# Patient Record
Sex: FEMALE | Race: WHITE | HISPANIC OR LATINO | URBAN - METROPOLITAN AREA
[De-identification: names, ages, dates, MRNs, and addresses within clinical notes are randomized per-mention and may not be internally consistent; named-entity substitution may affect disease eponyms.]

---

## 2017-04-27 ENCOUNTER — EMERGENCY (EMERGENCY)
Facility: HOSPITAL | Age: 22
LOS: 1 days | Discharge: HOME | End: 2017-04-27
Attending: EMERGENCY MEDICINE | Admitting: EMERGENCY MEDICINE
Payer: COMMERCIAL

## 2017-04-27 VITALS
TEMPERATURE: 98 F | DIASTOLIC BLOOD PRESSURE: 63 MMHG | OXYGEN SATURATION: 99 % | SYSTOLIC BLOOD PRESSURE: 109 MMHG | HEART RATE: 60 BPM | RESPIRATION RATE: 16 BRPM

## 2017-04-27 PROCEDURE — 99283 EMERGENCY DEPT VISIT LOW MDM: CPT

## 2017-04-27 NOTE — ED PROVIDER NOTE - NS ED MD SCRIBE ATTENDING SCRIBE SECTIONS
HISTORY OF PRESENT ILLNESS/VITAL SIGNS( Pullset)/HIV/DISPOSITION/PHYSICAL EXAM/PAST MEDICAL/SURGICAL/SOCIAL HISTORY/REVIEW OF SYSTEMS

## 2017-04-27 NOTE — ED PROVIDER NOTE - OBJECTIVE STATEMENT
22 y/o female with no significant PMHx of right knee pain x 2 days. Pt reports high amounts of walking 2 days ago. Later that night, onset of pain. Denies fever, nausea, vomiting, numbness, tingling, weakness, and any other complaints.

## 2017-04-27 NOTE — ED PROVIDER NOTE - PLAN OF CARE
Rest, drink plenty of fluids.  Advance activity as tolerated.  Continue all previously prescribed medications as directed.  Take Motrin 600 mg (three 200 mg over the counter pills) every 8 hours as needed for moderate pain -- take with food. Keep extremity elevated and wrapped.  Apply cool compresses to affected area for 15 minutes, 3-4 times per day. Follow up with your primary care physician in 48-72 hours- bring copies of your results.  Return to the ER for worsening or persistent symptoms, including but not limited to redness, warmth, inability to walk, fevers and/or ANY NEW OR CONCERNING SYMPTOMS. If you have issues obtaining follow up, please call: [phone] to obtain a doctor or specialist who takes your insurance in your area.

## 2017-04-27 NOTE — ED PROVIDER NOTE - MEDICAL DECISION MAKING DETAILS
Pt with knee swelling probable due to recent exercise. Probable bursitis. Will D/C home on RICE instructions.

## 2017-04-27 NOTE — ED PROVIDER NOTE - CARE PLAN
Principal Discharge DX:	Knee pain, right  Instructions for follow-up, activity and diet:	Rest, drink plenty of fluids.  Advance activity as tolerated.  Continue all previously prescribed medications as directed.  Take Motrin 600 mg (three 200 mg over the counter pills) every 8 hours as needed for moderate pain -- take with food. Keep extremity elevated and wrapped.  Apply cool compresses to affected area for 15 minutes, 3-4 times per day. Follow up with your primary care physician in 48-72 hours- bring copies of your results.  Return to the ER for worsening or persistent symptoms, including but not limited to redness, warmth, inability to walk, fevers and/or ANY NEW OR CONCERNING SYMPTOMS. If you have issues obtaining follow up, please call: [phone] to obtain a doctor or specialist who takes your insurance in your area. Principal Discharge DX:	Knee pain, right  Instructions for follow-up, activity and diet:	Rest, drink plenty of fluids.  Advance activity as tolerated.  Continue all previously prescribed medications as directed.  Take Motrin 600 mg (three 200 mg over the counter pills) every 8 hours as needed for moderate pain -- take with food. Keep extremity elevated and wrapped.  Apply cool compresses to affected area for 15 minutes, 3-4 times per day. Follow up with your primary care physician in 48-72 hours- bring copies of your results.  Return to the ER for worsening or persistent symptoms, including but not limited to redness, warmth, inability to walk, fevers and/or ANY NEW OR CONCERNING SYMPTOMS. If you have issues obtaining follow up, please call: [phone] to obtain a doctor or specialist who takes your insurance in your area.

## 2017-04-27 NOTE — ED ADULT TRIAGE NOTE - CHIEF COMPLAINT QUOTE
pt c/o right knee pain x2 days. pt denies injury. states " I was walking for a long time with my rain boots and now my knee hurts and feel stiff."

## 2018-12-12 ENCOUNTER — EMERGENCY (EMERGENCY)
Facility: HOSPITAL | Age: 23
LOS: 1 days | Discharge: HOME | End: 2018-12-12
Attending: EMERGENCY MEDICINE
Payer: COMMERCIAL

## 2018-12-12 VITALS
WEIGHT: 93.04 LBS | SYSTOLIC BLOOD PRESSURE: 126 MMHG | RESPIRATION RATE: 18 BRPM | HEART RATE: 120 BPM | HEIGHT: 59 IN | OXYGEN SATURATION: 99 % | DIASTOLIC BLOOD PRESSURE: 86 MMHG | TEMPERATURE: 98 F

## 2018-12-12 VITALS — HEART RATE: 79 BPM

## 2018-12-12 LAB
ALBUMIN SERPL ELPH-MCNC: 4.6 G/DL — SIGNIFICANT CHANGE UP (ref 3.3–5)
ALP SERPL-CCNC: 71 U/L — SIGNIFICANT CHANGE UP (ref 40–120)
ALT FLD-CCNC: 10 U/L — SIGNIFICANT CHANGE UP (ref 10–45)
ANION GAP SERPL CALC-SCNC: 15 MMOL/L — SIGNIFICANT CHANGE UP (ref 5–17)
AST SERPL-CCNC: 11 U/L — SIGNIFICANT CHANGE UP (ref 10–40)
BASOPHILS # BLD AUTO: 0 K/UL — SIGNIFICANT CHANGE UP (ref 0–0.2)
BASOPHILS NFR BLD AUTO: 0.3 % — SIGNIFICANT CHANGE UP (ref 0–2)
BILIRUB SERPL-MCNC: 0.4 MG/DL — SIGNIFICANT CHANGE UP (ref 0.2–1.2)
BUN SERPL-MCNC: 11 MG/DL — SIGNIFICANT CHANGE UP (ref 7–23)
CALCIUM SERPL-MCNC: 9.7 MG/DL — SIGNIFICANT CHANGE UP (ref 8.4–10.5)
CHLORIDE SERPL-SCNC: 102 MMOL/L — SIGNIFICANT CHANGE UP (ref 96–108)
CO2 SERPL-SCNC: 24 MMOL/L — SIGNIFICANT CHANGE UP (ref 22–31)
CREAT SERPL-MCNC: 0.75 MG/DL — SIGNIFICANT CHANGE UP (ref 0.5–1.3)
D DIMER BLD IA.RAPID-MCNC: <150 NG/ML DDU — SIGNIFICANT CHANGE UP
EOSINOPHIL # BLD AUTO: 0.1 K/UL — SIGNIFICANT CHANGE UP (ref 0–0.5)
EOSINOPHIL NFR BLD AUTO: 0.4 % — SIGNIFICANT CHANGE UP (ref 0–6)
GLUCOSE SERPL-MCNC: 96 MG/DL — SIGNIFICANT CHANGE UP (ref 70–99)
HCT VFR BLD CALC: 39.7 % — SIGNIFICANT CHANGE UP (ref 34.5–45)
HGB BLD-MCNC: 13.6 G/DL — SIGNIFICANT CHANGE UP (ref 11.5–15.5)
LYMPHOCYTES # BLD AUTO: 1.7 K/UL — SIGNIFICANT CHANGE UP (ref 1–3.3)
LYMPHOCYTES # BLD AUTO: 11.2 % — LOW (ref 13–44)
MCHC RBC-ENTMCNC: 33.1 PG — SIGNIFICANT CHANGE UP (ref 27–34)
MCHC RBC-ENTMCNC: 34.3 GM/DL — SIGNIFICANT CHANGE UP (ref 32–36)
MCV RBC AUTO: 96.3 FL — SIGNIFICANT CHANGE UP (ref 80–100)
MONOCYTES # BLD AUTO: 0.9 K/UL — SIGNIFICANT CHANGE UP (ref 0–0.9)
MONOCYTES NFR BLD AUTO: 5.6 % — SIGNIFICANT CHANGE UP (ref 2–14)
NEUTROPHILS # BLD AUTO: 12.7 K/UL — HIGH (ref 1.8–7.4)
NEUTROPHILS NFR BLD AUTO: 82.5 % — HIGH (ref 43–77)
PLATELET # BLD AUTO: 224 K/UL — SIGNIFICANT CHANGE UP (ref 150–400)
POTASSIUM SERPL-MCNC: 3.7 MMOL/L — SIGNIFICANT CHANGE UP (ref 3.5–5.3)
POTASSIUM SERPL-SCNC: 3.7 MMOL/L — SIGNIFICANT CHANGE UP (ref 3.5–5.3)
PROT SERPL-MCNC: 7.3 G/DL — SIGNIFICANT CHANGE UP (ref 6–8.3)
RBC # BLD: 4.13 M/UL — SIGNIFICANT CHANGE UP (ref 3.8–5.2)
RBC # FLD: 10.6 % — SIGNIFICANT CHANGE UP (ref 10.3–14.5)
SODIUM SERPL-SCNC: 141 MMOL/L — SIGNIFICANT CHANGE UP (ref 135–145)
WBC # BLD: 15.4 K/UL — HIGH (ref 3.8–10.5)
WBC # FLD AUTO: 15.4 K/UL — HIGH (ref 3.8–10.5)

## 2018-12-12 PROCEDURE — 85027 COMPLETE CBC AUTOMATED: CPT

## 2018-12-12 PROCEDURE — 93010 ELECTROCARDIOGRAM REPORT: CPT

## 2018-12-12 PROCEDURE — 99284 EMERGENCY DEPT VISIT MOD MDM: CPT | Mod: 25

## 2018-12-12 PROCEDURE — 80053 COMPREHEN METABOLIC PANEL: CPT

## 2018-12-12 PROCEDURE — 85379 FIBRIN DEGRADATION QUANT: CPT

## 2018-12-12 PROCEDURE — 84443 ASSAY THYROID STIM HORMONE: CPT

## 2018-12-12 PROCEDURE — 93005 ELECTROCARDIOGRAM TRACING: CPT

## 2018-12-12 RX ORDER — SODIUM CHLORIDE 9 MG/ML
1000 INJECTION INTRAMUSCULAR; INTRAVENOUS; SUBCUTANEOUS ONCE
Qty: 0 | Refills: 0 | Status: COMPLETED | OUTPATIENT
Start: 2018-12-12 | End: 2018-12-12

## 2018-12-12 RX ADMIN — SODIUM CHLORIDE 1000 MILLILITER(S): 9 INJECTION INTRAMUSCULAR; INTRAVENOUS; SUBCUTANEOUS at 21:56

## 2018-12-12 NOTE — ED ADULT NURSE REASSESSMENT NOTE - NS ED NURSE REASSESS COMMENT FT1
Patient d/c from ED, VSS, no longer having palpitations. Told to follow up with Cardiology. Discharge paperwork provided, verbalized understanding of teaching. Safety maintained.

## 2018-12-12 NOTE — ED PROVIDER NOTE - NSFOLLOWUPCLINICS_GEN_ALL_ED_FT
[facility]  Cardiology  [address] [place] [id number]  Phone: [phone]  Fax:   Follow Up Time:

## 2018-12-12 NOTE — ED PROVIDER NOTE - OBJECTIVE STATEMENT
Delroy Santiago (MD) : 22 y/o F pt with PMHx of anxiety, FHx of blood clot (paternal grandfather) c/o intermittent palpitations and nausea x2 weeks, worsening today. Currently not feeling any sx. States she normally gets the sx after eating. States that yesterday, pt had 1 episode of vomiting after eating. She said after eating she had a sensation of pressure, so she vomited. Noted CP with breathing deeply today. Notes sx is different from her hx of anxiety. Pt is sexually active, only sometimes using condoms. Denies diarrhea, fever, dysuria, vaginal discharge, leg pain/swelling, recent travel or any other complaints. LMP: last week. (few days late) Delroy Santiago (MD) : 24 y/o F pt with PMHx of anxiety, FHx of blood clot (paternal grandfather) c/o intermittent palpitations and nausea x2 weeks, worsening today. Currently not feeling any sx. States she normally gets the sx after eating. States that yesterday, pt had 1 episode of vomiting after eating. She said after eating she had a sensation of pressure, so she vomited. Noted CP with breathing deeply today. Notes sx is different from her hx of anxiety. Pt is sexually active, only sometimes using condoms. Denies diarrhea, fever, dysuria, vaginal discharge, leg pain/swelling, recent travel or any other complaints. No OCP use. LMP: last week. (few days late). Grandfather with DVT/PE

## 2018-12-12 NOTE — ED ADULT NURSE NOTE - OBJECTIVE STATEMENT
24 y/o female with PMH anxiety, arrives to ED with c/o palpitations x 2 weeks. Patient reports "I thought it was from my anxiety", but had chest pain starting yesterday. Patient is ambulatory without difficulty, appearing well, no chest pain upon arrival. Patient reports family history of blood clot on maternal side grandfather. Patient states when she feels palpitations "It takes my breath away". Patient had one episode of vomiting yesterday after eating. No fever/chills, no urinary symptoms, no recent travel. Reports she is sexual active, does not always use protection, does not take birth control. Patient tachycardic to 110's upon arrival to ED. CM in place. VSS, afebrile. Patient denies smoking, ETOH use, or drug use.

## 2018-12-12 NOTE — ED PROVIDER NOTE - PROGRESS NOTE DETAILS
Pt feeling much better. HR improved to 80s. No current symptoms. Discussed with patient neg results. TSH pending, F/u with Cards for possible Holter

## 2018-12-12 NOTE — ED PROVIDER NOTE - ENMT, MLM
Airway patent, Nasal mucosa clear. Mouth with normal mucosa. Throat has no vesicles, no oropharyngeal exudates and uvula is midline. No evidence of goiter

## 2018-12-12 NOTE — ED PROVIDER NOTE - NSFOLLOWUPINSTRUCTIONS_ED_ALL_ED_FT
Please follow-up with your primary doctor as needed. If you continue to have symptoms, please follow-up with Cardiology for possible Holter Monitor.     We will call if your Thyroid Tests are abnormal.     Drink plenty of fluids.

## 2018-12-12 NOTE — ED PROVIDER NOTE - MEDICAL DECISION MAKING DETAILS
Delroy Santiago (MD) : 24 y/o F pt with PMHx of anxiety presents to the ED for palpitations x2 weeks and 2 days of CP exacerbation with deep inspiration. Also notes 1 episode of vomiting yesterday. No evidence of DVT on exam. Plan: EKG labs with TSH, urine pregnancy, liter bolus and reassess. Delroy Santiago (MD) : 22 y/o F pt with PMHx of anxiety presents to the ED for palpitations x2 weeks and 2 days of CP exacerbation with deep inspiration. Also notes 1 episode of vomiting yesterday. No evidence of DVT on exam. Plan: EKG labs with TSH, Dimer, urine pregnancy, liter bolus and reassess. Discussed contraception use as pt uses intermittent condoms, no OCP. Delroy Santiago (MD) : 22 y/o F pt with PMHx of anxiety presents to the ED for palpitations x2 weeks and 2 days of CP exacerbation with deep inspiration. Also notes 1 episode of vomiting yesterday. No evidence of DVT on exam. Plan: EKG labs with TSH, Dimer, urine pregnancy, liter bolus and reassess. Discussed contraception use as pt uses intermittent condoms, no OCP.    Cierra Arevalo MD - Attending Physician: Pt here with intermittent palpitations. Now well appearing, mild tachy on arrival. Low risk for dvt/pe; however not PERC neg. ?hyperthyroid vs anemia vs arrhythmia vs anxiety vs other. Work-up as ordered

## 2018-12-12 NOTE — ED PROVIDER NOTE - ATTENDING CONTRIBUTION TO CARE
Cierra Arevalo MD - Attending Physician: I have personally seen and examined this patient with the resident/fellow.  I have fully participated in the care of this patient. I have reviewed all pertinent clinical information, including history, physical exam, plan and the Resident/Fellow’s note and agree except as noted. See MDM

## 2018-12-13 LAB — TSH SERPL-MCNC: 1.55 UIU/ML — SIGNIFICANT CHANGE UP (ref 0.27–4.2)

## 2022-08-20 ENCOUNTER — EMERGENCY (EMERGENCY)
Facility: HOSPITAL | Age: 27
LOS: 1 days | Discharge: HOME | End: 2022-08-20
Attending: EMERGENCY MEDICINE
Payer: COMMERCIAL

## 2022-08-20 VITALS
OXYGEN SATURATION: 100 % | WEIGHT: 93.04 LBS | RESPIRATION RATE: 18 BRPM | TEMPERATURE: 98 F | HEIGHT: 59 IN | SYSTOLIC BLOOD PRESSURE: 134 MMHG | HEART RATE: 111 BPM | DIASTOLIC BLOOD PRESSURE: 81 MMHG

## 2022-08-20 PROCEDURE — 12001 RPR S/N/AX/GEN/TRNK 2.5CM/<: CPT

## 2022-08-20 PROCEDURE — 99283 EMERGENCY DEPT VISIT LOW MDM: CPT | Mod: 25

## 2022-08-20 PROCEDURE — 99282 EMERGENCY DEPT VISIT SF MDM: CPT | Mod: 25

## 2022-08-20 NOTE — ED ADULT NURSE NOTE - HISTORY OF COVID-19 VACCINATION
Called pt and advised of Cinthya PUGA's note.  Pt verbalized understanding.    Pt states he is not feeling any better since medication change, states still have the symptoms of reflux. Advised will send a msg to EDD Mendes.    No

## 2022-08-20 NOTE — ED PROCEDURE NOTE - CPROC ED POST PROC CARE GUIDE1
Verbal/written post procedure instructions were given to patient/caregiver./Instructed patient/caregiver to follow-up with primary care physician./Instructed patient/caregiver regarding signs and symptoms of infection./Keep the cast/splint/dressing clean and dry. Verbal/written post procedure instructions were given to patient/caregiver./Instructed patient/caregiver regarding signs and symptoms of infection./Keep the cast/splint/dressing clean and dry.

## 2022-08-20 NOTE — ED ADULT TRIAGE NOTE - CHIEF COMPLAINT QUOTE
Pt stated she was cooking and accidentally picked up the knife wrong and cut the 2nd finger on her right hand this occurred 1/2 hour ago c/o throbbing sensation and pain

## 2022-08-20 NOTE — ED ADULT NURSE NOTE - CAS EDN DISCHARGE ASSESSMENT
Progress Note    SUBJECTIVE: Patient is seen for Active Problems:    Acute abdominal pain    Mild malnutrition (HCC)  Resolved Problems:    * No resolved hospital problems. *     abdominal pain better    OBJECTIVE:    Vitals:   Vitals:    07/20/18 0800   BP: (!) 153/58   Pulse: 53   Resp: 14   Temp:    SpO2: 92%     Weight: 152 lb 3.2 oz (69 kg)   Height: 5' 6\" (167.6 cm)   -----------------------------------------------------------------  Exam:    CONSTITUTIONAL:  awake, alert, cooperative, no apparent distress, and appears stated age  EYES:  Lids and lashes normal, pupils equal, round and reactive to light, extra ocular muscles intact, sclera clear, conjunctiva normal  ENT:  Normocephalic, without obvious abnormality, atraumatic, sinuses nontender on palpation, external ears without lesions, oral pharynx with moist mucus membranes, tonsils without erythema or exudates, gums normal and good dentition. NECK:  Supple, symmetrical, trachea midline, no adenopathy, thyroid symmetric, not enlarged and no tenderness, skin normal  HEMATOLOGIC/LYMPHATICS:  no cervical lymphadenopathy  LUNGS:  No increased work of breathing, good air exchange, clear to auscultation bilaterally, no crackles or wheezing  CARDIOVASCULAR:  Normal apical impulse, regular rate and rhythm, normal S1 and S2, no S3 or S4, and no murmur noted  ABDOMEN:  Soft,no tenderness,bowel sounds present, ng is positive for occult blood    MUSCULOSKELETAL:  there is no redness, warmth, or swelling of the joints  NEUROLOGIC:  Awake, alert, oriented to name, place and time. Cranial nerves II-XII are grossly intact. Motor is 5 out of 5 bilaterally. Cerebellar finger to nose, heel to shin intact. Sensory is intact.   Babinski down going, Romberg negative, and gait is normal.  SKIN:  no bruising or bleeding  EXT:     no cyanosis, clubbing or edema present    -----------------------------------------------------------------  Diagnostic Data: Reviewed    More Recent Troponin   Result Value Ref Range    Troponin T <0.03 <0.03 ng/mL    Troponin Interp         Lactic acid, plasma   Result Value Ref Range    Lactic Acid 0.8 0.5 - 2.2 mmol/L    Lactic Acid, Whole Blood NOT REPORTED 0.7 - 2.1 mmol/L   Occult blood gastric / duodenum   Result Value Ref Range    Specimen Description . GASTRIC     Special Requests NOT REPORTED     Direct Exam POSITIVE (A)     Status FINAL 07/20/2018    Comprehensive Metabolic Panel   Result Value Ref Range    Glucose 106 (H) 70 - 99 mg/dL    BUN 14 8 - 23 mg/dL    CREATININE 0.69 0.50 - 0.90 mg/dL    Bun/Cre Ratio 20 9 - 20    Calcium 9.4 8.6 - 10.4 mg/dL    Sodium 138 135 - 144 mmol/L    Potassium 4.1 3.7 - 5.3 mmol/L    Chloride 96 (L) 98 - 107 mmol/L    CO2 30 20 - 31 mmol/L    Anion Gap 12 9 - 17 mmol/L    Alkaline Phosphatase 65 35 - 104 U/L    ALT 7 5 - 33 U/L    AST 13 <32 U/L    Total Bilirubin 0.42 0.3 - 1.2 mg/dL    Total Protein 8.0 6.4 - 8.3 g/dL    Alb 3.4 (L) 3.5 - 5.2 g/dL    Albumin/Globulin Ratio 0.7 (L) 1.0 - 2.5    GFR Non-African American >60 >60 mL/min    GFR African American >60 >60 mL/min    GFR Comment          GFR Staging         EKG 12 Lead   Result Value Ref Range    Ventricular Rate 50 BPM    Atrial Rate 50 BPM    P-R Interval 188 ms    QRS Duration 94 ms    Q-T Interval 454 ms    QTc Calculation (Bazett) 413 ms    P Axis 31 degrees    R Axis 0 degrees    T Axis 34 degrees   EKG 12 Lead   Result Value Ref Range    Ventricular Rate 134 BPM    Atrial Rate 59 BPM    QRS Duration 134 ms    Q-T Interval 326 ms    QTc Calculation (Bazett) 486 ms    R Axis 70 degrees    T Axis 36 degrees   EKG 12 Lead   Result Value Ref Range    Ventricular Rate 81 BPM    Atrial Rate 81 BPM    P-R Interval 192 ms    QRS Duration 102 ms    Q-T Interval 404 ms    QTc Calculation (Bazett) 469 ms    P Axis 64 degrees    R Axis 12 degrees    T Axis 38 degrees   EKG 12 lead   Result Value Ref Range    Ventricular Rate 60 BPM    Atrial Rate 60 BPM    P-R Alert and oriented to person, place and time

## 2022-08-20 NOTE — ED PROVIDER NOTE - ATTENDING APP SHARED VISIT CONTRIBUTION OF CARE
Agree with NP H&P.  Pt presents with laceration to finger.  Would to be repaired in ED.  Will dc with return precautions.

## 2022-08-20 NOTE — ED PROVIDER NOTE - NSICDXPASTMEDICALHX_GEN_ALL_CORE_FT
PAST MEDICAL HISTORY:  No pertinent past medical history PAST MEDICAL HISTORY:  Anxiety     No pertinent past medical history

## 2022-08-20 NOTE — ED PROVIDER NOTE - OBJECTIVE STATEMENT
27 year old female with no PHMx presents to the ED with complaints of right 2nd finger laceration. Patient states she grabbed the knife the wrong way and it slipped, causing laceration. Also states she is unsure of her last Tetanus but does not want Tetanus here. Also does not want any anesthesia.   NKDA.

## 2022-08-20 NOTE — ED PROVIDER NOTE - NSFOLLOWUPINSTRUCTIONS_ED_ALL_ED_FT
Laceration    A laceration is a cut that goes through all of the layers of the skin and into the tissue that is right under the skin. Some lacerations heal on their own. Others need to be closed with skin adhesive strips, skin glue, stitches (sutures), or staples. Proper laceration care minimizes the risk of infection and helps the laceration to heal better.  If non-absorbable stitches or staples have been placed, they must be taken out within the time frame instructed by your healthcare provider.    SEEK IMMEDIATE MEDICAL CARE IF YOU HAVE ANY OF THE FOLLOWING SYMPTOMS: swelling around the wound, worsening pain, drainage from the wound, red streaking going away from your wound, inability to move finger or toe near the laceration, or discoloration of skin near the laceration.    Keep the dressing in place as is for 24 hours. Do not allow to become wet.    After 24 hours, you may remove the dressing and clean with regular soap and water daily. Do NOT scrub.    Dry thoroughly and apply bacitracin/ointment. Cover with regular bandage. Do not expose wound to light.     ***please return to the ER in 10 days for suture removal***

## 2022-08-20 NOTE — ED PROVIDER NOTE - CLINICAL SUMMARY MEDICAL DECISION MAKING FREE TEXT BOX
6 sutures placed. Patient tolerated procedure well despite not wanting any anesthesia. Patient was offered multiple times for Tetanus as well as risk of not having Tetanus but she still does not want it.

## 2022-08-20 NOTE — ED PROVIDER NOTE - SKIN, MLM
2cm gaping laceration to the meyer surface above the right 2nd PIP joint. No deep structure involvement. 5/5 strength with extension and flexion. Cap refill under 2s.

## 2022-08-20 NOTE — ED PROVIDER NOTE - PATIENT PORTAL LINK FT
You can access the FollowMyHealth Patient Portal offered by [facility] by registering at the following website: [email or web address]. By joining "[employer]’s FollowMyHealth portal, you will also be able to view your health information using other applications (apps) compatible with our system.

## 2022-09-01 ENCOUNTER — EMERGENCY (EMERGENCY)
Facility: HOSPITAL | Age: 27
LOS: 1 days | Discharge: HOME | End: 2022-09-01
Attending: EMERGENCY MEDICINE
Payer: COMMERCIAL

## 2022-09-01 VITALS
HEART RATE: 90 BPM | OXYGEN SATURATION: 98 % | SYSTOLIC BLOOD PRESSURE: 126 MMHG | RESPIRATION RATE: 16 BRPM | WEIGHT: 98.11 LBS | TEMPERATURE: 99 F | HEIGHT: 59 IN | DIASTOLIC BLOOD PRESSURE: 77 MMHG

## 2022-09-01 PROBLEM — F41.9 ANXIETY DISORDER, UNSPECIFIED: Chronic | Status: ACTIVE | Noted: 2022-08-23

## 2022-09-01 PROCEDURE — G0463: CPT

## 2022-09-01 PROCEDURE — L9995: CPT

## 2022-09-01 NOTE — ED PROVIDER NOTE - PHYSICAL EXAMINATION
Right index finger volar aspect of the PIP with 6 sutures in place.  No erythema induration discharge or dehiscence.  Difficulty flexing and extending the finger.  Capillary refill less than 2 seconds.

## 2022-09-01 NOTE — ED PROVIDER NOTE - PATIENT PORTAL LINK FT
You can access the FollowMyHealth Patient Portal offered by [facility] by registering at the following website: [email or web address]. By joining [employer]’s FollowMyHealth portal, you will also be able to view your health information using other applications (apps) compatible with our system.

## 2022-09-01 NOTE — ED PROVIDER NOTE - NSFOLLOWUPINSTRUCTIONS_ED_ALL_ED_FT
Start moving your finger (gently) more.  If you feel like the range of motion or the strength in your finger is not coming back to baseline you should follow up with the hand surgeon.    If you experience any new or worsening symptoms or if you are concerned you can always come back to the emergency for a re-evaluation.

## 2022-09-01 NOTE — ED PROVIDER NOTE - OBJECTIVE STATEMENT
27-year-old female, presents for suture removal status post laceration repair to the right index finger 10 days ago.  Was trying not to move the finger for the whole time now feeling some localized stiffness.  Denies any pain numbness tingling focal weakness discharge or any other complaints. Denies any fever, chills, redness, opening of wound, drainage, bleeding, streaking, numbness, tingling or any  other complaint.

## 2022-09-16 ENCOUNTER — APPOINTMENT (OUTPATIENT)
Dept: ORTHOPEDIC SURGERY | Facility: CLINIC | Age: 27
End: 2022-09-16
Payer: COMMERCIAL

## 2022-09-16 VITALS — HEIGHT: 59 IN | WEIGHT: 9.19 LBS | BODY MASS INDEX: 1.85 KG/M2

## 2022-09-16 DIAGNOSIS — S61.219A LACERATION W/OUT FOREIGN BODY OF UNSPECIFIED FINGER W/OUT DAMAGE TO NAIL, INITIAL ENCOUNTER: ICD-10-CM

## 2022-09-16 PROCEDURE — 99203 OFFICE O/P NEW LOW 30 MIN: CPT

## 2022-09-16 PROCEDURE — 73140 X-RAY EXAM OF FINGER(S): CPT | Mod: RT

## 2022-09-16 NOTE — IMAGING
[de-identified] : Right index finger\par 1.5 cm scar along the radial aspect of the PIP joint, mildly TTP\par No erythema, swelling or wounds\par Actively flexes/extends MCP/PIP/DIP joints\par Moderate stiffness at the PIP, flexes to 90 actively, 30 deg short of full extension\par SILT - mild paresthesias notes at scar\par \par

## 2022-09-16 NOTE — HISTORY OF PRESENT ILLNESS
[Sudden] : sudden [Dull/Aching] : dull/aching [Tightness] : tightness [Intermittent] : intermittent [Household chores] : household chores [Leisure] : leisure [Nothing helps with pain getting better] : Nothing helps with pain getting better [de-identified] : 28yo RHD F who presents to the office 3 weeks after sustaining a laceration to the right index finger. The patient notes that she was cutting peppers when she accidentally grabbed the knife incorrectly, cutting her finger at the level of the PIP joint. The patient was seen by urgent care where her wound was closed. Today she notes moderate stiffness and paresthesias at the level of the scar. [] : no [FreeTextEntry5] : Salena is here today for her RT Index finger.\par Cut it 3 weeks ago while cooking.\par Unable to bend the finger fully back and or forth.\par Swollen on the bottom half of the finger.\par Area of cut is just very sensitive  [de-identified] : 3 weeks ago

## 2022-09-16 NOTE — DISCUSSION/SUMMARY
[de-identified] : I had a long conversation with the patient today regarding the nature of this injury as well as the expected prognosis and potential treatment options. We discussed the role for bracing, injections and therapy. Fortunately she has an intact flexor mechanism without signs of neurovascular compromise. Her ROM could be improved and for that reason I will send her to hand therapy. The patient had the opportunity to ask questions, all of which were answered to her satisfaction. She is in agreement with this plan. I will see her again in 6 weeks for repeat evaluation.\par

## 2022-09-16 NOTE — DATA REVIEWED
[FreeTextEntry1] : 3 views of the right hand were obtained in the office today and independently evaluated without evidence of fracture or malalignment\par

## 2022-11-04 ENCOUNTER — APPOINTMENT (OUTPATIENT)
Dept: ORTHOPEDIC SURGERY | Facility: CLINIC | Age: 27
End: 2022-11-04

## 2023-11-22 NOTE — ED PROVIDER NOTE - HIV OFFER
Initial Liver Disease     Referred by:  Miki Knight DO      Dx:  Enlarged liver      Initial appointment:  12/18/2023 with Dr. Gilliland       Record and Insurance in Epic      Mailed new patient information     Opt out

## 2024-08-09 NOTE — ED PROVIDER NOTE - CONSTITUTIONAL, MLM
1 pair normal... Well appearing, well nourished, awake, alert, oriented to person, place, time/situation and in no apparent distress.

## 2024-09-05 ENCOUNTER — EMERGENCY (EMERGENCY)
Facility: HOSPITAL | Age: 29
LOS: 1 days | Discharge: HOME | End: 2024-09-05
Attending: EMERGENCY MEDICINE
Payer: COMMERCIAL

## 2024-09-05 VITALS
OXYGEN SATURATION: 99 % | WEIGHT: 78.93 LBS | RESPIRATION RATE: 20 BRPM | HEIGHT: 59 IN | DIASTOLIC BLOOD PRESSURE: 75 MMHG | SYSTOLIC BLOOD PRESSURE: 119 MMHG | TEMPERATURE: 99 F | HEART RATE: 96 BPM

## 2024-09-05 VITALS
RESPIRATION RATE: 18 BRPM | SYSTOLIC BLOOD PRESSURE: 106 MMHG | OXYGEN SATURATION: 100 % | DIASTOLIC BLOOD PRESSURE: 66 MMHG | HEART RATE: 80 BPM | TEMPERATURE: 99 F

## 2024-09-05 LAB
ALBUMIN SERPL ELPH-MCNC: 4.3 G/DL — SIGNIFICANT CHANGE UP (ref 3.3–5)
ALP SERPL-CCNC: 58 U/L — SIGNIFICANT CHANGE UP (ref 40–120)
ALT FLD-CCNC: 9 U/L — LOW (ref 10–45)
ANION GAP SERPL CALC-SCNC: 10 MMOL/L — SIGNIFICANT CHANGE UP (ref 5–17)
AST SERPL-CCNC: 16 U/L — SIGNIFICANT CHANGE UP (ref 10–40)
BASOPHILS # BLD AUTO: 0.03 K/UL — SIGNIFICANT CHANGE UP (ref 0–0.2)
BASOPHILS NFR BLD AUTO: 0.5 % — SIGNIFICANT CHANGE UP (ref 0–2)
BILIRUB SERPL-MCNC: 0.5 MG/DL — SIGNIFICANT CHANGE UP (ref 0.2–1.2)
BUN SERPL-MCNC: 14 MG/DL — SIGNIFICANT CHANGE UP (ref 7–23)
CALCIUM SERPL-MCNC: 9.7 MG/DL — SIGNIFICANT CHANGE UP (ref 8.4–10.5)
CHLORIDE SERPL-SCNC: 105 MMOL/L — SIGNIFICANT CHANGE UP (ref 96–108)
CO2 SERPL-SCNC: 22 MMOL/L — SIGNIFICANT CHANGE UP (ref 22–31)
CREAT SERPL-MCNC: 0.67 MG/DL — SIGNIFICANT CHANGE UP (ref 0.5–1.3)
EGFR: 123 ML/MIN/1.73M2 — SIGNIFICANT CHANGE UP
EOSINOPHIL # BLD AUTO: 0.04 K/UL — SIGNIFICANT CHANGE UP (ref 0–0.5)
EOSINOPHIL NFR BLD AUTO: 0.7 % — SIGNIFICANT CHANGE UP (ref 0–6)
GLUCOSE SERPL-MCNC: 87 MG/DL — SIGNIFICANT CHANGE UP (ref 70–99)
HCG SERPL-ACNC: <2 MIU/ML — SIGNIFICANT CHANGE UP
HCT VFR BLD CALC: 39.4 % — SIGNIFICANT CHANGE UP (ref 34.5–45)
HGB BLD-MCNC: 13 G/DL — SIGNIFICANT CHANGE UP (ref 11.5–15.5)
IMM GRANULOCYTES NFR BLD AUTO: 0.4 % — SIGNIFICANT CHANGE UP (ref 0–0.9)
LYMPHOCYTES # BLD AUTO: 1.35 K/UL — SIGNIFICANT CHANGE UP (ref 1–3.3)
LYMPHOCYTES # BLD AUTO: 24.5 % — SIGNIFICANT CHANGE UP (ref 13–44)
MCHC RBC-ENTMCNC: 32.4 PG — SIGNIFICANT CHANGE UP (ref 27–34)
MCHC RBC-ENTMCNC: 33 GM/DL — SIGNIFICANT CHANGE UP (ref 32–36)
MCV RBC AUTO: 98.3 FL — SIGNIFICANT CHANGE UP (ref 80–100)
MONOCYTES # BLD AUTO: 0.44 K/UL — SIGNIFICANT CHANGE UP (ref 0–0.9)
MONOCYTES NFR BLD AUTO: 8 % — SIGNIFICANT CHANGE UP (ref 2–14)
NEUTROPHILS # BLD AUTO: 3.63 K/UL — SIGNIFICANT CHANGE UP (ref 1.8–7.4)
NEUTROPHILS NFR BLD AUTO: 65.9 % — SIGNIFICANT CHANGE UP (ref 43–77)
NRBC # BLD: 0 /100 WBCS — SIGNIFICANT CHANGE UP (ref 0–0)
PLATELET # BLD AUTO: 226 K/UL — SIGNIFICANT CHANGE UP (ref 150–400)
POTASSIUM SERPL-MCNC: 3.7 MMOL/L — SIGNIFICANT CHANGE UP (ref 3.5–5.3)
POTASSIUM SERPL-SCNC: 3.7 MMOL/L — SIGNIFICANT CHANGE UP (ref 3.5–5.3)
PROT SERPL-MCNC: 7.2 G/DL — SIGNIFICANT CHANGE UP (ref 6–8.3)
RBC # BLD: 4.01 M/UL — SIGNIFICANT CHANGE UP (ref 3.8–5.2)
RBC # FLD: 11.8 % — SIGNIFICANT CHANGE UP (ref 10.3–14.5)
SODIUM SERPL-SCNC: 137 MMOL/L — SIGNIFICANT CHANGE UP (ref 135–145)
WBC # BLD: 5.51 K/UL — SIGNIFICANT CHANGE UP (ref 3.8–10.5)
WBC # FLD AUTO: 5.51 K/UL — SIGNIFICANT CHANGE UP (ref 3.8–10.5)

## 2024-09-05 PROCEDURE — 36415 COLL VENOUS BLD VENIPUNCTURE: CPT

## 2024-09-05 PROCEDURE — 82570 ASSAY OF URINE CREATININE: CPT

## 2024-09-05 PROCEDURE — 84585 ASSAY OF URINE VMA: CPT

## 2024-09-05 PROCEDURE — 76705 ECHO EXAM OF ABDOMEN: CPT | Mod: 26

## 2024-09-05 PROCEDURE — 85025 COMPLETE CBC W/AUTO DIFF WBC: CPT

## 2024-09-05 PROCEDURE — 99285 EMERGENCY DEPT VISIT HI MDM: CPT | Mod: 25

## 2024-09-05 PROCEDURE — 93005 ELECTROCARDIOGRAM TRACING: CPT

## 2024-09-05 PROCEDURE — 99285 EMERGENCY DEPT VISIT HI MDM: CPT

## 2024-09-05 PROCEDURE — 84702 CHORIONIC GONADOTROPIN TEST: CPT

## 2024-09-05 PROCEDURE — 80053 COMPREHEN METABOLIC PANEL: CPT

## 2024-09-05 PROCEDURE — 76705 ECHO EXAM OF ABDOMEN: CPT

## 2024-09-05 NOTE — ED PROVIDER NOTE - PATIENT PORTAL LINK FT
You can access the FollowMyHealth Patient Portal offered by [facility] by registering at the following website: [email or web address]. By joining "[employer]’s FollowMyHealth portal, you will also be able to view your health information using other applications (apps) compatible with our system.

## 2024-09-05 NOTE — ED ADULT NURSE NOTE - NSFALLUNIVINTERV_ED_ALL_ED
Bed/Stretcher in lowest position, wheels locked, appropriate side rails in place/Call bell, personal items and telephone in reach/Instruct patient to call for assistance before getting out of bed/chair/stretcher/Non-slip footwear applied when patient is off stretcher/[place] to call system/Physically safe environment - no spills, clutter or unnecessary equipment/Purposeful proactive rounding/Room/bathroom lighting operational, light cord in reach

## 2024-09-05 NOTE — ED PROVIDER NOTE - CLINICAL SUMMARY MEDICAL DECISION MAKING FREE TEXT BOX
Maged 29-year-old female no significant past medical history presents with 3 to 4 weeks of feeling mass under right ribs patient replies intermittent anxiety palpitations and headaches none currently the mass is tender to touch has seen PCP and had outpatient endocrine follow-up without any results currently patient denies any fever or any nausea vomiting any vision changes any chest pain or shortness of breath normal bowel movements patient sexually active 1 partner LMP 2 weeks ago history of chlamydia in past no vaginal discharge no vaginal bleeding on examination hemodynamically stable not tachycardic or hypertensive EKG nonischemic looking for any signs of arrhythmia will check electrolytes looking for any metabolic derangements abdominal exam soft nontender there is a palpable mobile mass in the inferior edge of the subphrenic area on the right uncertain etiology will POCUS if equivocal consider CT imaging with in setting of right upper quadrant mass will consider sending off urine metanephrines although pheochromocytoma low likelihood

## 2024-09-05 NOTE — ED ADULT TRIAGE NOTE - WEIGHT IN LBS
Chief Complaint   Patient presents with    Labs    Follow Up Chronic Condition     1. \"Have you been to the ER, urgent care clinic since your last visit? Hospitalized since your last visit? \" No    2. \"Have you seen or consulted any other health care providers outside of the [address] [place] [address] since your last visit? \" No     3. For patients aged 39-70: Has the patient had a colonoscopy / FIT/ Cologuard? No      If the patient is female:    4. For patients aged 41-77: Has the patient had a mammogram within the past 2 years? NA - based on age or sex      11. For patients aged 21-65: Has the patient had a pap smear?  NA - based on age or sex 78.9

## 2024-09-05 NOTE — ED PROVIDER NOTE - NSFOLLOWUPINSTRUCTIONS_ED_ALL_ED_FT
follow uup with your doctor tell them that you may need mri to further evaluate [place] soft tissue mass return with fever, nausea or vomiting- or worseing symptoms

## 2024-09-05 NOTE — ED PROVIDER NOTE - PROGRESS NOTE DETAILS
labs and ekg unremarkable pt in no pain wants to go home pocus nml appearing liver gb and kidney - question soft tissue mass recommend ct for further imaging - pt jony doesn't want radiation -- low suspcion for any finding that needs emergent intervention shared decion making and instructed pt that if she dclines ct she should speak with pcp re getting mri as outpt =-

## 2024-09-05 NOTE — ED ADULT NURSE NOTE - OBJECTIVE STATEMENT
30yo F presents to ED with c/o R sided abdominal pain x3 weeks. pt states "something is sticking out by my ribs". pt reports she went to an endocrinologist outpatient who told her she might have a tumor. pt endorses intermittent pain associated that she states is an "internal feeling" and denies any pain on palpation. pt denies any associated nausea, vomiting, diarrhea. on exam pt is generally well-appearing in NAD. pt ambulatory with steady gait independently. VIDAL. Abdomen soft nontender nondistended, no palpable masses noted. pt seen and eval by ED MD. PIV placed to [facility], labs drawn and sent. Patient undressed and placed into gown, bed locked in lowest position, and appropriate side rails up for safety. mother at bedside. pending CT scan. plan of care discussed.

## 2024-09-05 NOTE — ED ADULT TRIAGE NOTE - CHIEF COMPLAINT QUOTE
Right-sided abdominal pain for 3 weeks, "it feels like something is sticking out." PT states she saw an endocrinologist who told her it was tumor. PT also endorsing dizziness, fatigue, and 10 lb weight loss in 1 month. PT denies N/V/D.

## 2024-09-11 LAB
CREATININE, RNDM UR: 66.1 MG/DL — SIGNIFICANT CHANGE UP
VMA /GCREATININE: 4.8 MG/G CREAT — SIGNIFICANT CHANGE UP (ref 0–6)

## 2024-11-11 NOTE — ED PROVIDER NOTE - IV ALTEPLASE ADMIN OUTSIDE HIDDEN
Order signed.   Patient called office with updated BP and Temp for today. Documented in Epic.   show

## 2025-02-08 ENCOUNTER — EMERGENCY (EMERGENCY)
Facility: HOSPITAL | Age: 30
LOS: 1 days | Discharge: HOME | End: 2025-02-08
Attending: STUDENT IN AN ORGANIZED HEALTH CARE EDUCATION/TRAINING PROGRAM
Payer: COMMERCIAL

## 2025-02-08 VITALS
RESPIRATION RATE: 18 BRPM | OXYGEN SATURATION: 98 % | HEART RATE: 90 BPM | DIASTOLIC BLOOD PRESSURE: 71 MMHG | TEMPERATURE: 99 F | SYSTOLIC BLOOD PRESSURE: 104 MMHG

## 2025-02-08 VITALS
HEART RATE: 75 BPM | SYSTOLIC BLOOD PRESSURE: 114 MMHG | TEMPERATURE: 99 F | RESPIRATION RATE: 18 BRPM | HEIGHT: 59 IN | OXYGEN SATURATION: 99 % | WEIGHT: 82.89 LBS | DIASTOLIC BLOOD PRESSURE: 64 MMHG

## 2025-02-08 LAB
ALBUMIN SERPL ELPH-MCNC: 4.4 G/DL — SIGNIFICANT CHANGE UP (ref 3.3–5)
ALP SERPL-CCNC: 54 U/L — SIGNIFICANT CHANGE UP (ref 40–120)
ALT FLD-CCNC: 12 U/L — SIGNIFICANT CHANGE UP (ref 10–45)
ANION GAP SERPL CALC-SCNC: 11 MMOL/L — SIGNIFICANT CHANGE UP (ref 5–17)
APPEARANCE UR: CLEAR — SIGNIFICANT CHANGE UP
AST SERPL-CCNC: 20 U/L — SIGNIFICANT CHANGE UP (ref 10–40)
BACTERIA # UR AUTO: ABNORMAL /HPF
BASOPHILS # BLD AUTO: 0.03 K/UL — SIGNIFICANT CHANGE UP (ref 0–0.2)
BASOPHILS NFR BLD AUTO: 0.4 % — SIGNIFICANT CHANGE UP (ref 0–2)
BILIRUB SERPL-MCNC: 0.7 MG/DL — SIGNIFICANT CHANGE UP (ref 0.2–1.2)
BILIRUB UR-MCNC: NEGATIVE — SIGNIFICANT CHANGE UP
BUN SERPL-MCNC: 12 MG/DL — SIGNIFICANT CHANGE UP (ref 7–23)
CALCIUM SERPL-MCNC: 9.1 MG/DL — SIGNIFICANT CHANGE UP (ref 8.4–10.5)
CAST: 0 /LPF — SIGNIFICANT CHANGE UP (ref 0–4)
CHLORIDE SERPL-SCNC: 103 MMOL/L — SIGNIFICANT CHANGE UP (ref 96–108)
CO2 SERPL-SCNC: 23 MMOL/L — SIGNIFICANT CHANGE UP (ref 22–31)
COLOR SPEC: YELLOW — SIGNIFICANT CHANGE UP
CREAT SERPL-MCNC: 0.64 MG/DL — SIGNIFICANT CHANGE UP (ref 0.5–1.3)
DIFF PNL FLD: NEGATIVE — SIGNIFICANT CHANGE UP
EGFR: 124 ML/MIN/1.73M2 — SIGNIFICANT CHANGE UP
EOSINOPHIL # BLD AUTO: 0.11 K/UL — SIGNIFICANT CHANGE UP (ref 0–0.5)
EOSINOPHIL NFR BLD AUTO: 1.6 % — SIGNIFICANT CHANGE UP (ref 0–6)
GLUCOSE SERPL-MCNC: 88 MG/DL — SIGNIFICANT CHANGE UP (ref 70–99)
GLUCOSE UR QL: NEGATIVE MG/DL — SIGNIFICANT CHANGE UP
HCG SERPL-ACNC: <2 MIU/ML — SIGNIFICANT CHANGE UP
HCT VFR BLD CALC: 38 % — SIGNIFICANT CHANGE UP (ref 34.5–45)
HGB BLD-MCNC: 12.5 G/DL — SIGNIFICANT CHANGE UP (ref 11.5–15.5)
IMM GRANULOCYTES NFR BLD AUTO: 0.4 % — SIGNIFICANT CHANGE UP (ref 0–0.9)
KETONES UR-MCNC: NEGATIVE MG/DL — SIGNIFICANT CHANGE UP
LEUKOCYTE ESTERASE UR-ACNC: ABNORMAL
LIDOCAIN IGE QN: 35 U/L — SIGNIFICANT CHANGE UP (ref 7–60)
LYMPHOCYTES # BLD AUTO: 1.92 K/UL — SIGNIFICANT CHANGE UP (ref 1–3.3)
LYMPHOCYTES # BLD AUTO: 28.5 % — SIGNIFICANT CHANGE UP (ref 13–44)
MCHC RBC-ENTMCNC: 31.8 PG — SIGNIFICANT CHANGE UP (ref 27–34)
MCHC RBC-ENTMCNC: 32.9 G/DL — SIGNIFICANT CHANGE UP (ref 32–36)
MCV RBC AUTO: 96.7 FL — SIGNIFICANT CHANGE UP (ref 80–100)
MONOCYTES # BLD AUTO: 0.6 K/UL — SIGNIFICANT CHANGE UP (ref 0–0.9)
MONOCYTES NFR BLD AUTO: 8.9 % — SIGNIFICANT CHANGE UP (ref 2–14)
NEUTROPHILS # BLD AUTO: 4.04 K/UL — SIGNIFICANT CHANGE UP (ref 1.8–7.4)
NEUTROPHILS NFR BLD AUTO: 60.2 % — SIGNIFICANT CHANGE UP (ref 43–77)
NITRITE UR-MCNC: NEGATIVE — SIGNIFICANT CHANGE UP
NRBC # BLD: 0 /100 WBCS — SIGNIFICANT CHANGE UP (ref 0–0)
NRBC BLD-RTO: 0 /100 WBCS — SIGNIFICANT CHANGE UP (ref 0–0)
PH UR: 7 — SIGNIFICANT CHANGE UP (ref 5–8)
PLATELET # BLD AUTO: 200 K/UL — SIGNIFICANT CHANGE UP (ref 150–400)
POTASSIUM SERPL-MCNC: 3.6 MMOL/L — SIGNIFICANT CHANGE UP (ref 3.5–5.3)
POTASSIUM SERPL-SCNC: 3.6 MMOL/L — SIGNIFICANT CHANGE UP (ref 3.5–5.3)
PROT SERPL-MCNC: 6.9 G/DL — SIGNIFICANT CHANGE UP (ref 6–8.3)
PROT UR-MCNC: NEGATIVE MG/DL — SIGNIFICANT CHANGE UP
RBC # BLD: 3.93 M/UL — SIGNIFICANT CHANGE UP (ref 3.8–5.2)
RBC # FLD: 11.6 % — SIGNIFICANT CHANGE UP (ref 10.3–14.5)
RBC CASTS # UR COMP ASSIST: 1 /HPF — SIGNIFICANT CHANGE UP (ref 0–4)
SODIUM SERPL-SCNC: 137 MMOL/L — SIGNIFICANT CHANGE UP (ref 135–145)
SP GR SPEC: 1.02 — SIGNIFICANT CHANGE UP (ref 1–1.03)
SQUAMOUS # UR AUTO: 18 /HPF — HIGH (ref 0–5)
UROBILINOGEN FLD QL: 0.2 MG/DL — SIGNIFICANT CHANGE UP (ref 0.2–1)
WBC # BLD: 6.73 K/UL — SIGNIFICANT CHANGE UP (ref 3.8–10.5)
WBC # FLD AUTO: 6.73 K/UL — SIGNIFICANT CHANGE UP (ref 3.8–10.5)
WBC UR QL: 7 /HPF — HIGH (ref 0–5)

## 2025-02-08 PROCEDURE — 81001 URINALYSIS AUTO W/SCOPE: CPT

## 2025-02-08 PROCEDURE — 84702 CHORIONIC GONADOTROPIN TEST: CPT

## 2025-02-08 PROCEDURE — 74019 RADEX ABDOMEN 2 VIEWS: CPT | Mod: 26

## 2025-02-08 PROCEDURE — 80053 COMPREHEN METABOLIC PANEL: CPT

## 2025-02-08 PROCEDURE — 76830 TRANSVAGINAL US NON-OB: CPT | Mod: 26

## 2025-02-08 PROCEDURE — 76705 ECHO EXAM OF ABDOMEN: CPT

## 2025-02-08 PROCEDURE — 99285 EMERGENCY DEPT VISIT HI MDM: CPT | Mod: 25

## 2025-02-08 PROCEDURE — 93975 VASCULAR STUDY: CPT

## 2025-02-08 PROCEDURE — 99285 EMERGENCY DEPT VISIT HI MDM: CPT

## 2025-02-08 PROCEDURE — 76830 TRANSVAGINAL US NON-OB: CPT

## 2025-02-08 PROCEDURE — 93975 VASCULAR STUDY: CPT | Mod: 26

## 2025-02-08 PROCEDURE — 76705 ECHO EXAM OF ABDOMEN: CPT | Mod: 26,59

## 2025-02-08 PROCEDURE — 74019 RADEX ABDOMEN 2 VIEWS: CPT | Mod: MC

## 2025-02-08 PROCEDURE — 83690 ASSAY OF LIPASE: CPT

## 2025-02-08 PROCEDURE — 85025 COMPLETE CBC W/AUTO DIFF WBC: CPT

## 2025-02-08 RX ORDER — ONDANSETRON 4 MG/1
4 TABLET, ORALLY DISINTEGRATING ORAL ONCE
Refills: 0 | Status: COMPLETED | OUTPATIENT
Start: 2025-02-08 | End: 2025-02-08

## 2025-02-08 RX ORDER — ACETAMINOPHEN 160 MG/5ML
575 SUSPENSION ORAL ONCE
Refills: 0 | Status: COMPLETED | OUTPATIENT
Start: 2025-02-08 | End: 2025-02-08

## 2025-02-08 RX ORDER — FAMOTIDINE 10 MG/ML
20 INJECTION INTRAVENOUS ONCE
Refills: 0 | Status: COMPLETED | OUTPATIENT
Start: 2025-02-08 | End: 2025-02-08

## 2025-02-08 RX ORDER — BACTERIOSTATIC SODIUM CHLORIDE 0.9 %
1000 VIAL (ML) INJECTION ONCE
Refills: 0 | Status: COMPLETED | OUTPATIENT
Start: 2025-02-08 | End: 2025-02-08

## 2025-02-08 NOTE — ED PROVIDER NOTE - ATTENDING CONTRIBUTION TO CARE
29-year-old female with no reported past medical history, presents to the emergency department with 1 week of right lower quadrant pain, acutely worsening over the last 12 hours with an associated episode of NBNB vomiting. Patient reports the pain has now subsided while being in the emergency department.  Has not taken anything for symptoms. Not sexually active. Denies fever, chills, chest pain, shortness of breath, diarrhea, dysuria, hematuria, vaginal discharge.    Physical Exam:  Gen: NAD, awake and alert, non-toxic appearing  HEENT: Atraumatic, oropharynx clear, moist mucous membranes, normal conjunctiva  Cardio: RRR, no murmurs, rubs or gallops  Lung: CTAB, no respiratory distress, no wheezes/rhonchi/rales B/L  Abd: soft, Mild right lower quadrant tenderness to palpation without rebound or guarding.  Negative Ochoa's.  MSK: no visible deformities, ROMx4   Neuro: No focal sensory or motor deficits  Skin: Warm, well perfused, no rash, no leg swelling     Patient presents with acute abdominal pain.  Considering appendicitis, ovarian pathology such as ruptured ovarian cyst, ovarian torsion, ectopic, UTI, pyelo.  CBC, CMP, TVUS, CT A/P  Will reassess the need for additional interventions as clinically warranted. Refer to any progress notes for updates on clinical course and as a continuation of this MDM.

## 2025-02-08 NOTE — ED PROVIDER NOTE - OBJECTIVE STATEMENT
29-year-old female no significant past medical history presents to the ED for evaluation of abdominal pain X 1 week that has acutely worsened since yesterday.  Patient states 1 week she had this crampy right lower quadrant abdominal pain, yesterday acutely became more severe and associated with few episodes of vomiting.  Denies fevers, diarrhea, chest pain, back pain, urinary symptoms, vaginal bleeding.  Patient states she has never had pain like this in the past.  Denies any sick contacts, recent travels, has not taken anything for the pain prior to arrival.  Denies any history of ovarian cysts

## 2025-02-08 NOTE — ED PROVIDER NOTE - NSFOLLOWUPINSTRUCTIONS_ED_ALL_ED_FT
Abdominal Pain    Many things can cause abdominal pain. Many times, abdominal pain is not caused by a disease and will improve without treatment. Your health care provider will do a physical exam to determine if there is a dangerous cause of your pain; blood tests and imaging may help determine the cause of your pain. However, in many cases, no cause may be found and you may need further testing as an outpatient. Monitor your abdominal pain for any changes.     SEEK IMMEDIATE MEDICAL CARE IF YOU HAVE ANY OF THE FOLLOWING SYMPTOMS: worsening abdominal pain, uncontrollable vomiting, profuse diarrhea, inability to have bowel movements or pass gas, black or bloody stools, fever accompanying chest pain or back pain, or fainting. These symptoms may represent a serious problem that is an emergency. Do not wait to see if the symptoms will go away. Get medical help right away. Call 911 and do not drive yourself to the hospital.    please follow up with your primary care doctor within a week    for your hemorrhagic cyst please follow up with obgyn.     Someone from the ED should contact you to help schedule your specialty appointment. if someone does not contact you please use a number provided below to help schedule your appointment.    You are to follow-up in the next 1-2 weeks with [facility] Division of Obstetrics and Gynecology at [facility]. Please call [phone] for an appointment. Abdominal Pain    Many things can cause abdominal pain. Many times, abdominal pain is not caused by a disease and will improve without treatment. Your health care provider will do a physical exam to determine if there is a dangerous cause of your pain; blood tests and imaging may help determine the cause of your pain. However, in many cases, no cause may be found and you may need further testing as an outpatient. Monitor your abdominal pain for any changes.     SEEK IMMEDIATE MEDICAL CARE IF YOU HAVE ANY OF THE FOLLOWING SYMPTOMS: worsening abdominal pain, uncontrollable vomiting, profuse diarrhea, inability to have bowel movements or pass gas, black or bloody stools, fever accompanying chest pain or back pain, or fainting. These symptoms may represent a serious problem that is an emergency. Do not wait to see if the symptoms will go away. Get medical help right away. Call 911 and do not drive yourself to the hospital.    you were offered a CT but declined, you felt better and ate and drank. attached are your results, please follow up with your primary care doctor.     please follow up with your primary care doctor within a week    for your hemorrhagic cyst please follow up with obgyn.     Someone from the ED should contact you to help schedule your specialty appointment. if someone does not contact you please use a number provided below to help schedule your appointment.    You are to follow-up in the next 1-2 weeks with [facility] Division of Obstetrics and Gynecology at [facility]. Please call [phone] for an appointment.

## 2025-02-08 NOTE — ED ADULT NURSE REASSESSMENT NOTE - NS ED NURSE REASSESS COMMENT FT1
Patient requesting to leave against medical advice. MD Montgomery made aware and to speak with patient. Patient agreeable to stay until discussion with MD and keep IV in. Safety and comfort provided.
Patient tolerated PO. Patient denies nausea, vomiting and abdominal pain. Safety and comfort provided.
Received report from KIM Martinez. Patient a/ox4, breathing spontaneously. Patient endorses improvement of abdominal pain, "I don't feel it right now". Patient denies nausea, vomiting, chest pain and dyspnea. Safety and comfort provided.

## 2025-02-08 NOTE — ED PROVIDER NOTE - PROGRESS NOTE DETAILS
Dr. Jihan Finley, ATTENDING: Patient is refusing CT scan of abdomen.  Ultrasound reveals a hemorrhagic cyst on the left ovary with moderate fluid.  This does not appear consistent with patient's pain on right lower quadrant and therefore cannot rule out appendicitis.  Explained this to patient and discussed the risk vs benefits of a CT scan. However, patient does not feel comfortable receiving the CT scan. Patient is hemodynamically stable and has had not had increase of pain.  Pain has been managed with p.o. meds.  pt still re-assing if she would like CT scan prior to dispo Dr. Jihan Finley, ATTENDING: Patient is refusing CT scan of abdomen.  Ultrasound reveals a hemorrhagic cyst on the left ovary with mild free fluid.  This does not appear to correlate with patient's pain on right lower quadrant and therefore cannot rule out appendicitis.  Explained this to patient and discussed the risk vs benefits of a CT scan. However, patient does not feel comfortable receiving the CT scan. Patient is hemodynamically stable and has had not had increase of pain.  Pain has been managed with p.o. meds.  pt still re-assing if she would like CT scan prior to dispo Nikki PGY-1 DO:   After numerous conversations with the patient she has finally agreed to a CT abdomen pelvis, will order CT abdomen pelvis she will go next.  Pending dispo.  If abdomen pelvis is negative patient can likely follow-up outpatient for OB/GYN for this hemorrhagic cyst as her H&H is stable and she is not having significant tenderness to palpation Viviana Montgomery MD (PGY-3 EM): received sign out patient pending CT to eval appy, patient refusing CT, will get US to eval appy. Viviana Montgomery MD (PGY-3 EM): US did not visualize appy. patient pain improved, tolerated PO. discussed strict return precautions, need for obgyn and pmd f/u. patient declined CT/MRI for further eval, understands risks of not obtaining imaging and concern for appy.

## 2025-02-08 NOTE — ED PROVIDER NOTE - PATIENT PORTAL LINK FT
You can access the FollowMyHealth Patient Portal offered by [facility] by registering at the following website: [email or web address]. By joining [facility]’s FollowMyHealth portal, you will also be able to view your health information using other applications (apps) compatible with our system.

## 2025-02-08 NOTE — ED ADULT NURSE NOTE - OBJECTIVE STATEMENT
Patient c/o lower abd pain accompanied by nausea and one episode of vomiting today. Patient states pain worsens with movement and after urinating/BM. Denies fever, chills, sob, chest pain, burning with urination, blood in stool/urine. No tenderness on palpation. Patient A&Ox4, ambulatory. No signs of acute distress at this time. Plan of care in progress.

## 2025-02-08 NOTE — ED ADULT NURSE NOTE - NSFALLUNIVINTERV_ED_ALL_ED
Bed/Stretcher in lowest position, wheels locked, appropriate side rails in place/Call bell, personal items and telephone in reach/Instruct patient to call for assistance before getting out of bed/chair/stretcher/Non-slip footwear applied when patient is off stretcher/[place] to call system/Physically safe environment - no spills, clutter or unnecessary equipment/Purposeful proactive rounding/Room/bathroom lighting operational, light cord in reach

## 2025-02-24 ENCOUNTER — APPOINTMENT (OUTPATIENT)
Dept: OBGYN | Facility: CLINIC | Age: 30
End: 2025-02-24